# Patient Record
Sex: FEMALE | Race: WHITE | Employment: FULL TIME | URBAN - METROPOLITAN AREA
[De-identification: names, ages, dates, MRNs, and addresses within clinical notes are randomized per-mention and may not be internally consistent; named-entity substitution may affect disease eponyms.]

---

## 2021-09-05 ENCOUNTER — HOSPITAL ENCOUNTER (EMERGENCY)
Age: 31
Discharge: HOME | End: 2021-09-05
Attending: EMERGENCY MEDICINE
Payer: COMMERCIAL

## 2021-09-05 VITALS
RESPIRATION RATE: 15 BRPM | HEIGHT: 67 IN | SYSTOLIC BLOOD PRESSURE: 133 MMHG | HEART RATE: 89 BPM | OXYGEN SATURATION: 99 % | WEIGHT: 218.9 LBS | BODY MASS INDEX: 34.36 KG/M2 | DIASTOLIC BLOOD PRESSURE: 77 MMHG | TEMPERATURE: 98.1 F

## 2021-09-05 DIAGNOSIS — L50.9 URTICARIAL RASH: Primary | ICD-10-CM

## 2021-09-05 DIAGNOSIS — Z3A.37 37 WEEKS GESTATION OF PREGNANCY: ICD-10-CM

## 2021-09-05 PROCEDURE — 96372 THER/PROPH/DIAG INJ SC/IM: CPT

## 2021-09-05 PROCEDURE — 6360000002 HC RX W HCPCS: Performed by: EMERGENCY MEDICINE

## 2021-09-05 PROCEDURE — 99283 EMERGENCY DEPT VISIT LOW MDM: CPT

## 2021-09-05 RX ORDER — PREDNISONE 10 MG/1
TABLET ORAL
Qty: 30 TABLET | Refills: 0 | Status: SHIPPED | OUTPATIENT
Start: 2021-09-05 | End: 2021-09-15

## 2021-09-05 RX ORDER — METHYLPREDNISOLONE SODIUM SUCCINATE 125 MG/2ML
80 INJECTION, POWDER, LYOPHILIZED, FOR SOLUTION INTRAMUSCULAR; INTRAVENOUS ONCE
Status: COMPLETED | OUTPATIENT
Start: 2021-09-05 | End: 2021-09-05

## 2021-09-05 RX ADMIN — METHYLPREDNISOLONE SODIUM SUCCINATE 80 MG: 125 INJECTION, POWDER, FOR SOLUTION INTRAMUSCULAR; INTRAVENOUS at 12:16

## 2021-09-05 NOTE — ED PROVIDER NOTES
TRIAGE CHIEF COMPLAINT:   Chief Complaint   Patient presents with    Urticaria         HPI: Sophy Jain is a 33 y.o. female who presents to the Emergency Department with complaint of urticarial rash that began 4 days ago. It started on the abdomen and spread to her entire body. Denies shortness of breath. No new medications or new exposures. She saw her OB/GYN who prescribed hydrocortisone cream and then Benadryl. Despite that the rash is getting worse and she is itching. She talk to her OB doctor today who recommended she come to the ED to get a shot of a steroid and a prescription for prednisone. Denies any facial or oral swelling. No abdominal pain or bleeding. She is feeling fetal movement. Patient is 37 weeks pregnant and is scheduled to be induced on September 15. REVIEW OF SYSTEMS:  6 systems reviewed. Pertinent positives per HPI. Otherwise noted to be negative. Nursing notes reviewed and agree with above. Past medical/surgical history reviewed. MEDICATIONS   Patient's Medications    No medications on file         ALLERGIES   Allergies   Allergen Reactions    Amoxicillin     Penicillins          /77   Pulse 89   Temp 98.1 °F (36.7 °C) (Oral)   Resp 15   Ht 5' 7\" (1.702 m)   Wt 218 lb 14.4 oz (99.3 kg)   LMP 12/17/2020 (Exact Date)   SpO2 99%   Breastfeeding No   BMI 34.28 kg/m²   General:  No acute distress. Non toxic appearance  Head:   Normocephalic and atraumatic  Eyes:   Conjunctiva clear, ANNA, EOM's intact. Sclera anicteric. ENT:   Mucous membranes moist  Neck:   Supple. No adenopathy. Lungs/Chest:  No respiratory distress  CVS:   Regular rate and rhythm  Extremities:  Full range of motion  Skin:   Scattered confluent urticarial rash mostly on her gravid abdomen and to a lesser extent on the extremities and neck. No vesicles. Neuro:  Alert and OX3. Speech clear and appropriate. No extremity weakness. Normal sensation in all extremities.   No facial asymmetry. Gait normal.  Psych:   Affect normal. Mood normal        RADIOLOGY:      LAB      ED COURSE / MDM:  38-year-old female, 37 weeks pregnant, scheduled to be induced on September 15, no abdominal pain or bleeding, with 4-day history of itchy urticarial rash that started on her abdomen and spread to her body. No shortness of breath or chest pain. No facial or oral swelling. She has not getting any relief from hydrocortisone cream or Benadryl. Her OB/GYN referred her to the ED to get a shot of steroids and a prescription for prednisone. She was given 80 mg of Solu-Medrol IM. She will be given a prescription for prednisone. She was offered a different antihistamine but states she will continue taking Benadryl. Advise return here for worse symptoms such as shortness of breath facial or oral swelling. I discussed with Marissa Ureña the results of evaluation in the Emergency Department, diagnosis, care and prognosis. The plan is to discharge to home. The patient is in agreement with the plan and questions have been answered. I also discussed with the patient and/or family the reasons which may require a return visit and the importance of follow-up care.        (Please note that portions of this note may have been completed with a voice recognition program.  Efforts were made to edit the dictation but occasionally words are mis-transcribed)        FINAL IMPRESSION:  1 --urticarial rash  2 --37 weeks pregnancy                  Negar Arzate MD  09/05/21 [id number]

## 2021-09-05 NOTE — ED TRIAGE NOTES
32y/o female presents to the ED with hives, onset Wednesday. Pt was treated by OB/GYN with hydrocortisone cream and then started Benadryl on Friday d/t worsening of hives. Pt states today she continues to have hives and itching.  Pt referred by OB/Gy to be seen in ED. -sob -c/p

## 2022-12-23 ENCOUNTER — APPOINTMENT (OUTPATIENT)
Dept: ULTRASOUND IMAGING | Age: 32
End: 2022-12-23
Payer: COMMERCIAL

## 2022-12-23 ENCOUNTER — HOSPITAL ENCOUNTER (EMERGENCY)
Age: 32
Discharge: HOME | End: 2022-12-24
Attending: EMERGENCY MEDICINE
Payer: COMMERCIAL

## 2022-12-23 DIAGNOSIS — O46.90 VAGINAL BLEEDING IN PREGNANCY: Primary | ICD-10-CM

## 2022-12-23 LAB
ABO/RH: NORMAL
ANION GAP SERPL CALCULATED.3IONS-SCNC: 8 MMOL/L (ref 3–16)
ANTIBODY SCREEN: NORMAL
BACTERIA WET PREP: NORMAL
BACTERIA: ABNORMAL /HPF
BILIRUBIN URINE: NEGATIVE
BLOOD, URINE: ABNORMAL
BUN BLDV-MCNC: 12 MG/DL (ref 7–20)
CALCIUM SERPL-MCNC: 9.6 MG/DL (ref 8.3–10.6)
CHLORIDE BLD-SCNC: 105 MMOL/L (ref 99–110)
CLARITY: CLEAR
CLUE CELLS: NORMAL
CO2: 27 MMOL/L (ref 21–32)
COLOR: YELLOW
CREAT SERPL-MCNC: 0.9 MG/DL (ref 0.6–1.1)
EPITHELIAL CELLS WET PREP: NORMAL
EPITHELIAL CELLS, UA: ABNORMAL /HPF (ref 0–5)
GFR SERPL CREATININE-BSD FRML MDRD: >60 ML/MIN/{1.73_M2}
GLUCOSE BLD-MCNC: 102 MG/DL (ref 70–99)
GLUCOSE URINE: NEGATIVE MG/DL
GONADOTROPIN, CHORIONIC (HCG) QUANT: 2342 MIU/ML
HCT VFR BLD CALC: 37.6 % (ref 36–48)
HCT VFR BLD CALC: 39.5 % (ref 36–48)
HEMOGLOBIN: 12.8 G/DL (ref 12–16)
HEMOGLOBIN: 13.3 G/DL (ref 12–16)
KETONES, URINE: NEGATIVE MG/DL
LEUKOCYTE ESTERASE, URINE: ABNORMAL
MCH RBC QN AUTO: 28.2 PG (ref 26–34)
MCHC RBC AUTO-ENTMCNC: 33.7 G/DL (ref 31–36)
MCV RBC AUTO: 83.6 FL (ref 80–100)
MICROSCOPIC EXAMINATION: YES
NITRITE, URINE: NEGATIVE
PDW BLD-RTO: 13.9 % (ref 12.4–15.4)
PH UA: 5.5 (ref 5–8)
PLATELET # BLD: 278 K/UL (ref 135–450)
PMV BLD AUTO: 8.2 FL (ref 5–10.5)
POTASSIUM REFLEX MAGNESIUM: 3.8 MMOL/L (ref 3.5–5.1)
PROTEIN UA: NEGATIVE MG/DL
RBC # BLD: 4.72 M/UL (ref 4–5.2)
RBC UA: ABNORMAL /HPF (ref 0–4)
RBC WET PREP: NORMAL
SODIUM BLD-SCNC: 140 MMOL/L (ref 136–145)
SOURCE WET PREP: NORMAL
SPECIFIC GRAVITY UA: <=1.005 (ref 1–1.03)
TRICHOMONAS PREP: NORMAL
URINE REFLEX TO CULTURE: ABNORMAL
URINE TYPE: ABNORMAL
UROBILINOGEN, URINE: 0.2 E.U./DL
WBC # BLD: 5 K/UL (ref 4–11)
WBC UA: ABNORMAL /HPF (ref 0–5)
WBC WET PREP: NORMAL
YEAST WET PREP: NORMAL

## 2022-12-23 PROCEDURE — 85014 HEMATOCRIT: CPT

## 2022-12-23 PROCEDURE — 86901 BLOOD TYPING SEROLOGIC RH(D): CPT

## 2022-12-23 PROCEDURE — 76801 OB US < 14 WKS SINGLE FETUS: CPT

## 2022-12-23 PROCEDURE — 84702 CHORIONIC GONADOTROPIN TEST: CPT

## 2022-12-23 PROCEDURE — 99284 EMERGENCY DEPT VISIT MOD MDM: CPT

## 2022-12-23 PROCEDURE — 81001 URINALYSIS AUTO W/SCOPE: CPT

## 2022-12-23 PROCEDURE — 96372 THER/PROPH/DIAG INJ SC/IM: CPT

## 2022-12-23 PROCEDURE — 6370000000 HC RX 637 (ALT 250 FOR IP): Performed by: EMERGENCY MEDICINE

## 2022-12-23 PROCEDURE — 86850 RBC ANTIBODY SCREEN: CPT

## 2022-12-23 PROCEDURE — 87210 SMEAR WET MOUNT SALINE/INK: CPT

## 2022-12-23 PROCEDURE — 87491 CHLMYD TRACH DNA AMP PROBE: CPT

## 2022-12-23 PROCEDURE — 86900 BLOOD TYPING SEROLOGIC ABO: CPT

## 2022-12-23 PROCEDURE — 80048 BASIC METABOLIC PNL TOTAL CA: CPT

## 2022-12-23 PROCEDURE — 85018 HEMOGLOBIN: CPT

## 2022-12-23 PROCEDURE — 85027 COMPLETE CBC AUTOMATED: CPT

## 2022-12-23 PROCEDURE — 87591 N.GONORRHOEAE DNA AMP PROB: CPT

## 2022-12-23 RX ORDER — CEFUROXIME AXETIL 250 MG/1
500 TABLET ORAL EVERY 12 HOURS SCHEDULED
Status: DISCONTINUED | OUTPATIENT
Start: 2022-12-23 | End: 2022-12-24 | Stop reason: HOSPADM

## 2022-12-23 RX ORDER — CEFUROXIME AXETIL 250 MG/1
250 TABLET ORAL 2 TIMES DAILY
Qty: 14 TABLET | Refills: 0 | Status: SHIPPED | OUTPATIENT
Start: 2022-12-23 | End: 2022-12-30

## 2022-12-23 RX ORDER — ACETAMINOPHEN 500 MG
1000 TABLET ORAL ONCE
Status: DISCONTINUED | OUTPATIENT
Start: 2022-12-23 | End: 2022-12-23

## 2022-12-23 RX ADMIN — CEFUROXIME AXETIL 500 MG: 250 TABLET, FILM COATED ORAL at 20:29

## 2022-12-23 ASSESSMENT — ENCOUNTER SYMPTOMS
SORE THROAT: 0
SHORTNESS OF BREATH: 0
CHEST TIGHTNESS: 0
ABDOMINAL PAIN: 0
NAUSEA: 0
VOMITING: 0

## 2022-12-23 ASSESSMENT — PAIN - FUNCTIONAL ASSESSMENT
PAIN_FUNCTIONAL_ASSESSMENT: NONE - DENIES PAIN
PAIN_FUNCTIONAL_ASSESSMENT: NONE - DENIES PAIN

## 2022-12-24 VITALS
WEIGHT: 179.9 LBS | BODY MASS INDEX: 28.24 KG/M2 | HEIGHT: 67 IN | OXYGEN SATURATION: 98 % | SYSTOLIC BLOOD PRESSURE: 126 MMHG | HEART RATE: 99 BPM | RESPIRATION RATE: 18 BRPM | TEMPERATURE: 97.7 F | DIASTOLIC BLOOD PRESSURE: 79 MMHG

## 2022-12-24 LAB — RHIG LOT NUMBER: NORMAL

## 2022-12-24 PROCEDURE — 6360000002 HC RX W HCPCS: Performed by: STUDENT IN AN ORGANIZED HEALTH CARE EDUCATION/TRAINING PROGRAM

## 2022-12-24 RX ADMIN — HUMAN RHO(D) IMMUNE GLOBULIN 300 MCG: 300 INJECTION, SOLUTION INTRAMUSCULAR at 01:57

## 2022-12-24 ASSESSMENT — PAIN SCALES - GENERAL: PAINLEVEL_OUTOF10: 0

## 2022-12-24 ASSESSMENT — PAIN - FUNCTIONAL ASSESSMENT: PAIN_FUNCTIONAL_ASSESSMENT: 0-10

## 2022-12-24 NOTE — DISCHARGE INSTRUCTIONS
Please have your pregnancy levels repeated in 48 hours. Return to the hospital if you have any new abdominal pain, nausea, vomiting, episodes of passing out or if you have any additional concerns. You can take Tylenol if you have some mild pain.

## 2022-12-24 NOTE — ED NOTES
Pt. In no acute distress. Breathing easy and educated on follow up care/medications. Ambulated out of ED with partner.       Chang Lin RN  12/24/22 [id number]

## 2022-12-24 NOTE — ED NOTES
Pt was prepared to be transported by POV to [facility] ED for ultrasound not available at referring. IV removed prior to discharge. Pt does not appear to be in any distress at this time. Report called to Prashanth Early RN at [facility].      Dax Cisneros RN  12/23/22 2051

## 2022-12-24 NOTE — ED PROVIDER NOTES
[address] [facility]  EMERGENCY DEPARTMENTENCOUNTER      Pt Name: Jane Leavitt  MRN: [id number]  [place] 1990  Date ofevaluation: 2022  Provider: Meka Hennessy MD    CHIEF COMPLAINT       Chief Complaint   Patient presents with    Vaginal Bleeding     Pt states that she is 6 weeks pregnant and started spotting last evening and then again today and called on call nurse and referred to er for evaluation         HISTORY OF PRESENT ILLNESS   (Location/Symptom, Timing/Onset,Context/Setting, Quality, Duration, Modifying Factors, Severity)  Note limiting factors. Jane Leavitt is a 27 y.o. female  who  has no past medical history on file. who presents to the emergency department for evaluation of vaginal bleeding in the first trimester. Patient reports that she had a positive pregnancy test approximately 1 week ago. She states she is currently 6 weeks pregnant by dates. Reports her last menstrual period was  and was abnormal timing and duration. Reports having a scant amount of blood-tinged discharge previous day and is now having gross hemorrhage from her vagina. Denies injury or trauma. Abdominal pains fevers nausea or vomiting. Patient is a  and states she no complications during her previous pregnancy. She is establish care with her OB Dr. Bill Bruno but has not yet seen them for prenatal visit. She did call on-call nurse and was instructed to present to the ED. patient reports that she is Rh- and did receive RhoGAM during her previous pregnancy. She states that she is pretty sure the father of the child is Rh+.     HPI    NursingNotes were reviewed. REVIEW OF SYSTEMS    (2-9 systems for level 4, 10 or more for level 5)     Review of Systems   Constitutional:  Negative for fever. Gastrointestinal:  Negative for abdominal pain, nausea and vomiting. Genitourinary:  Positive for menstrual problem and vaginal bleeding.  Negative for dysuria, pelvic pain, urgency, vaginal discharge and vaginal pain. Except as noted above the remainder of the review of systems was reviewed and negative. PAST MEDICAL HISTORY   History reviewed. No pertinent past medical history. SURGICALHISTORY     History reviewed. No pertinent surgical history. CURRENT MEDICATIONS       Previous Medications    No medications on file            Amoxicillin and Penicillins    FAMILY HISTORY     History reviewed. No pertinent family history. SOCIAL HISTORY       Social History     Socioeconomic History    Marital status:      Spouse name: None    Number of children: None    Years of education: None    Highest education level: None   Tobacco Use    Smoking status: Never    Smokeless tobacco: Never   Substance and Sexual Activity    Alcohol use: Never    Drug use: Never       SCREENINGS    [place] Coma Scale  Eye Opening: Spontaneous  Best Verbal Response: Oriented  Best Motor Response: Obeys commands  David Coma Scale Score: 15        PHYSICAL EXAM    (up to 7 for level 4, 8 or more for level 5)     ED Triage Vitals [12/23/22 1839]   BP Temp Temp Source Heart Rate Resp SpO2 Height Weight   (!) 137/99 98.4 °F (36.9 °C) Oral 83 14 100 % 5' 7\" (1.702 m) 179 lb 14.4 oz (81.6 kg)       Physical Exam  Vitals reviewed. Constitutional:       Appearance: She is well-developed. HENT:      Head: Normocephalic and atraumatic. Mouth/Throat:      Mouth: Mucous membranes are moist.   Eyes:      Extraocular Movements: Extraocular movements intact. Conjunctiva/sclera: Conjunctivae normal.      Pupils: Pupils are equal, round, and reactive to light. Neck:      Trachea: No tracheal deviation. Cardiovascular:      Rate and Rhythm: Normal rate and regular rhythm. Heart sounds: Normal heart sounds. Pulmonary:      Effort: Pulmonary effort is normal.      Breath sounds: Normal breath sounds. Abdominal:      General: There is no distension. Palpations: Abdomen is soft. Tenderness: There is no abdominal tenderness. There is no guarding or rebound. Genitourinary:     Comments: Blood products in vaginal vault. Patient has a closed os with some blood products/proximal of conception at the os opening. No lacerations friability or tenderness on exam.   Musculoskeletal:         General: Normal range of motion. Cervical back: Normal range of motion. Skin:     General: Skin is warm and dry. Capillary Refill: Capillary refill takes less than 2 seconds. Neurological:      Mental Status: She is alert. RESULTS     EKG: All EKG's are interpreted by the Emergency Department Physician who either signs or Co-signsthis chart in the absence of a cardiologist.      RADIOLOGY:   Remy Nicole such as CT, Ultrasound and MRI are read by the radiologist. Plain radiographic images are visualized and preliminarily interpreted by the emergency physician with the below findings:    Interpretation per the Radiologist below, if available at the time ofthis note:    No orders to display         ED BEDSIDE ULTRASOUND:   Performed by ED Physician - none    LABS:  Labs Reviewed   URINALYSIS WITH REFLEX TO CULTURE - Abnormal; Notable for the following components:       Result Value    Blood, Urine LARGE (*)     Leukocyte Esterase, Urine SMALL (*)     All other components within normal limits   MICROSCOPIC URINALYSIS - Abnormal; Notable for the following components:    WBC, UA 6-9 (*)     Bacteria, UA 1+ (*)     All other components within normal limits   WET PREP, GENITAL   C.TRACHOMATIS N.GONORRHOEAE DNA   CBC   BASIC METABOLIC PANEL W/ REFLEX TO MG FOR LOW K   HCG, QUANTITATIVE, PREGNANCY   PREGNANCY, URINE       All other labs were within normal range or not returned as of this dictation.     EMERGENCY DEPARTMENT COURSE and DIFFERENTIAL DIAGNOSIS/MDM:   Vitals:    Vitals:    12/23/22 1839   BP: (!) 137/99   Pulse: 83   Resp: 14   Temp: 98.4 °F (36.9 °C)   TempSrc: Oral   SpO2: 100% Weight: 179 lb 14.4 oz (81.6 kg)   Height: 5' 7\" (1.702 m)       Patient was given thefollowing medications:  Medications - No data to display    ED COURSE & MEDICAL DECISION MAKING    Pertinent Labs & Imaging studies reviewed. (See chart for details)   -  Patient seen and evaluated in the emergency department. -  Triage and nursing notes reviewed and incorporated. -  Old chart records reviewed and incorporated. -  Differential diagnosis includes: Differential diagnosis: Ectopic pregnancy, Molar pregnancy, Miscarriage, Hemorrhagic Shock, Rh incompatibility, UTI, other.    -  Work-up included:  See above  -  ED treatment included: See above  -  Results discussed with patient. 75-year-old female G2, P1 states she is approximate 6 weeks pregnant by dates presents the ED for evaluation of vaginal bleeding. On exam vital signs within normal limits and abdominal exam is benign. labs show an appropriately elevated beta hCG. Hemoglobin within normal limits. Urinalysis does have leukocyte esterase and pyuria and patient was started on a course of oral antibiotics. Because the patient is Rh- and likely will need a ultrasound to rule out ectopic pregnancy she was sent to [facility]. where there is RhoGAM available at ultrasound. Medical necessity discussed with patient she is amenable to treatment plan. patient feels improved on reevaluation. Symptomatic treatment with expectant management discussed with the patient and they and/or family members present are amenable to treatment plan and outpatient follow-up. Strict return precautions were discussed with the patient and those present. They demonstrated understanding of when to return to the emergency department for new or worsening symptoms. .  The patient is agreeable with plan of care and disposition. Is this patient to be included in the SEP-1 Core Measure due to severe sepsis or septic shock?    No   Exclusion criteria - the patient is NOT to be included for SEP-1 Core Measure due to:  2+ SIRS criteria are not met      REASSESSMENT     ED Course as of 12/23/22 2324   Fri Dec 23, 2022   2234 Transabdominal ultrasound at this time there is no evidence of an intrauterine pregnancy appreciated. The patient was noted to be tachycardic upon arrival here and she states that she is feeling anxious but she is also has had slightly increased more bleeding and thus an H&H was repeated. [EI]      ED Course User Index  [EI] Pascual Alonzo MD         CRITICAL CARE TIME   Total Critical Care time was 0 minutes, excluding separately reportable procedures. There was a high probability of clinically significant/life threatening deterioration in the patient's condition which required my urgent intervention. CONSULTS:  None    PROCEDURES:  Unless otherwise noted below, none     Procedures    FINAL IMPRESSION      1. Threatened , antepartum          DISPOSITION/PLAN   DISPOSITION        PATIENT REFERREDTO:  No follow-up provider specified.     DISCHARGEMEDICATIONS:  New Prescriptions    No medications on file          (Please note that portions of this note were completed with a voice recognition program.  Efforts were made to edit the dictations but occasionally words are mis-transcribed.)    Ricardo Patel MD (electronically signed)  Attending Emergency Physician          Ricardo Patel MD  [phone]       Ricardo Patel MD  22 [phone]

## 2022-12-24 NOTE — ED PROVIDER NOTES
[address] ENCOUNTER          ATTENDING PHYSICIAN NOTE       Date of evaluation: 12/23/2022    Chief Complaint     Vaginal Bleeding (Pt states that she is 6 weeks pregnant and started spotting last evening and then again today and called on call nurse and referred to er for evaluation)      History of Present Illness     Imani Wheeler is a 27 y.o. female G2, P1 who presents to the hospital today for evaluation of vaginal bleeding. The patient was seen in outside hospital earlier today after she came in with spotting that she has had intermittently since yesterday that since improved last night but has increased in frequency at this time. She denies any pain with this. She had a pregnancy she carried to term the first time and at that time had received RhoGAM as she had had some bleeding with a Rh negative blood type. She denies any lightheadedness syncope but states that her bleeding has since become worse. Review of Systems     Review of Systems   Constitutional:  Negative for chills and fever. HENT:  Negative for congestion and sore throat. Respiratory:  Negative for chest tightness and shortness of breath. Cardiovascular:  Negative for chest pain and leg swelling. Gastrointestinal:  Negative for abdominal pain, nausea and vomiting. Genitourinary:  Positive for vaginal bleeding. Negative for difficulty urinating, flank pain and pelvic pain. Musculoskeletal:  Negative for gait problem and neck pain. Skin:  Negative for pallor and wound. Neurological:  Negative for dizziness, syncope, weakness and light-headedness. Hematological:  Negative for adenopathy. Psychiatric/Behavioral:  Negative for confusion. Past Medical, Surgical, Family, and Social History     She has no past medical history on file. She has no past surgical history on file. Her family history is not on file. She reports that she has never smoked.  She has never used smokeless tobacco. She reports that she does not drink alcohol and does not use drugs. Medications     Previous Medications    No medications on file       Allergies     She is allergic to amoxicillin and penicillins. Physical Exam     INITIAL VITALS: BP: (!) 137/99, Temp: 98.4 °F (36.9 °C), Heart Rate: 83, Resp: 14, SpO2: 100 %   Physical Exam  Vitals and nursing note reviewed. Constitutional:       Appearance: Normal appearance. She is well-developed. She is not ill-appearing or diaphoretic. HENT:      Head: Normocephalic and atraumatic. Cardiovascular:      Rate and Rhythm: Normal rate and regular rhythm. Pulmonary:      Effort: Pulmonary effort is normal.      Breath sounds: Normal breath sounds. Abdominal:      Palpations: Abdomen is soft. Musculoskeletal:         General: No tenderness or deformity. Normal range of motion. Cervical back: Neck supple. Skin:     General: Skin is warm and dry. Findings: No erythema. Neurological:      General: No focal deficit present. Mental Status: She is alert and oriented to person, place, and time. Cranial Nerves: No cranial nerve deficit. Coordination: Coordination normal.       Diagnostic Results       RADIOLOGY:  US OB LESS THAN 14 WEEKS SINGLE OR FIRST GESTATION   Final Result      1. Pregnancy of unknown location. Thickened endometrial bilayer containing a 16 mm hyperechoic ovoid lesion and no definite intrauterine gestational sac. There is also a 2 cm hyperechoic left ovarian lesion which is suspicious for a dermoid cyst or    other incidental finding. Given last menstrual period 5 weeks 6 days ago, recommend serial beta HCG measurements and follow-up ultrasound.           LABS:   Results for orders placed or performed during the hospital encounter of 12/23/22   Wet prep, genital    Specimen: Vaginal   Result Value Ref Range    Trichomonas Prep None Seen     Yeast, Wet Prep None Seen     Clue Cells, Wet Prep None Seen     WBC, Wet Prep <1+ RBC, Wet Prep 4+     Epi Cells 2+     Bacteria 1+     Source Wet Prep Vaginal    BMP w/ Reflex to MG   Result Value Ref Range    Sodium 140 136 - 145 mmol/L    Potassium reflex Magnesium 3.8 3.5 - 5.1 mmol/L    Chloride 105 99 - 110 mmol/L    CO2 27 21 - 32 mmol/L    Anion Gap 8 3 - 16    Glucose 102 (H) 70 - 99 mg/dL    BUN 12 7 - 20 mg/dL    Creatinine 0.9 0.6 - 1.1 mg/dL    Est, Glom Filt Rate >60 >60    Calcium 9.6 8.3 - 10.6 mg/dL   CBC   Result Value Ref Range    WBC 5.0 4.0 - 11.0 K/uL    RBC 4.72 4.00 - 5.20 M/uL    Hemoglobin 13.3 12.0 - 16.0 g/dL    Hematocrit 39.5 36.0 - 48.0 %    MCV 83.6 80.0 - 100.0 fL    MCH 28.2 26.0 - 34.0 pg    MCHC 33.7 31.0 - 36.0 g/dL    RDW 13.9 12.4 - 15.4 %    Platelets 103 558 - 115 K/uL    MPV 8.2 5.0 - 10.5 fL   HCG, Quantitative, Pregnancy   Result Value Ref Range    hCG Quant 2342.0 <5.0 mIU/mL   Urinalysis with Reflex to Culture    Specimen: Urine   Result Value Ref Range    Color, UA Yellow Straw/Yellow    Clarity, UA Clear Clear    Glucose, Ur Negative Negative mg/dL    Bilirubin Urine Negative Negative    Ketones, Urine Negative Negative mg/dL    Specific Gravity, UA <=1.005 1.005 - 1.030    Blood, Urine LARGE (A) Negative    pH, UA 5.5 5.0 - 8.0    Protein, UA Negative Negative mg/dL    Urobilinogen, Urine 0.2 <2.0 E.U./dL    Nitrite, Urine Negative Negative    Leukocyte Esterase, Urine SMALL (A) Negative    Microscopic Examination YES     Urine Type NotGiven     Urine Reflex to Culture Not Indicated    Microscopic Urinalysis   Result Value Ref Range    WBC, UA 6-9 (A) 0 - 5 /HPF    RBC, UA 3-4 0 - 4 /HPF    Epithelial Cells, UA 2-5 0 - 5 /HPF    Bacteria, UA 1+ (A) None Seen /HPF   Hemoglobin and Hematocrit   Result Value Ref Range    Hemoglobin 12.8 12.0 - 16.0 g/dL    Hematocrit 37.6 36.0 - 48.0 %   TYPE AND SCREEN   Result Value Ref Range    ABO/Rh B NEG     Antibody Screen NEG    RHOGAM INJECTION ONLY   Result Value Ref Range    RHIG LOT NUMBER RhImmuneGlobulin    [id number]          issued       1 vial  22 01:26       ED BEDSIDE ULTRASOUND:  No results found. RECENT VITALS:  BP: 126/79,Temp: 97.7 °F (36.5 °C), Heart Rate: (!) 105, Resp: 14, SpO2: 98 %     Procedures         ED Course     Nursing Notes, Past Medical Hx, Past Surgical Hx, Social Hx,Allergies, and Family Hx were reviewed. ED Course as of 12/24/22 0203   Fri Dec 23, 2022   2234 Transabdominal ultrasound at this time there is no evidence of an intrauterine pregnancy appreciated. The patient was noted to be tachycardic upon arrival here and she states that she is feeling anxious but she is also has had slightly increased more bleeding and thus an H&H was repeated. [EI]      ED Course User Index  [EI] Anurag Campo MD     Patient had a pelvic exam done at outside hospital which was found to have a closed cervical os with some blood in the vaginal vault. She was on some IV antibiotics due to concern for urinary tract infection as well prior to arrival.  The patient's heart rate improved here in the department after all her work-up was done. She does not appear to be significantly more anemic than she was earlier today. patient was given the following medications:  Orders Placed This Encounter   Medications    cefUROXime (CEFTIN) tablet 500 mg     Order Specific Question:   Antimicrobial Indications     Answer:   Urinary Tract Infection    cefUROXime (CEFTIN) 250 MG tablet     Sig: Take 1 tablet by mouth 2 times daily for 7 days     Dispense:  14 tablet     Refill:  0    rho(D) immune globulin (HYPERRHO S/D) injection 300 mcg    DISCONTD: acetaminophen (TYLENOL) tablet 1,000 mg    rho(D) immune globulin (HYPERRHO S/D) injection 300 mcg       CONSULTS:  None    MEDICAL DECISIONMAKING / ASSESSMENT / Jennyarmando Johnston is a 27 y.o. female  who presented to the hospital for evaluation of vaginal bleeding.   Patient noted to have closed cervical os and her transvaginal ultrasound at this time shows no definitive intrauterine pregnancy. She is currently hemodynamically stable was encouraged to come back to the hospital to have her hCG levels repeated in 48 hours. She was given a dose of RhoGAM here at this time. She was encouraged to continue taking antibiotics that were prescribed. Clinical Impression     1. Vaginal bleeding in pregnancy        Disposition     PATIENT REFERRED TO:  Anton Barajas.   [facility] [employer] [id number]  [facility]  [phone]    Schedule an appointment as soon as possible for a visit   As needed    [facility]  SARA Stubbs [address] [place] [address]  [phone]  In 2 days  to have repeat hCG levels obtained      DISCHARGE MEDICATIONS:  New Prescriptions    CEFUROXIME (CEFTIN) 250 MG TABLET    Take 1 tablet by mouth 2 times daily for 7 days       DISPOSITION Decision To Discharge 12/24/2022 01:29:24 AM         Aguila Berry MD  12/24/22 7484

## 2022-12-25 ENCOUNTER — HOSPITAL ENCOUNTER (EMERGENCY)
Age: 32
Discharge: HOME | End: 2022-12-25
Attending: STUDENT IN AN ORGANIZED HEALTH CARE EDUCATION/TRAINING PROGRAM
Payer: COMMERCIAL

## 2022-12-25 VITALS
HEART RATE: 92 BPM | RESPIRATION RATE: 16 BRPM | OXYGEN SATURATION: 94 % | TEMPERATURE: 98.1 F | DIASTOLIC BLOOD PRESSURE: 91 MMHG | SYSTOLIC BLOOD PRESSURE: 136 MMHG

## 2022-12-25 DIAGNOSIS — O36.80X0 PREGNANCY, LOCATION UNKNOWN: Primary | ICD-10-CM

## 2022-12-25 LAB — GONADOTROPIN, CHORIONIC (HCG) QUANT: 2926 MIU/ML

## 2022-12-25 PROCEDURE — 36415 COLL VENOUS BLD VENIPUNCTURE: CPT

## 2022-12-25 PROCEDURE — 99283 EMERGENCY DEPT VISIT LOW MDM: CPT

## 2022-12-25 PROCEDURE — 84702 CHORIONIC GONADOTROPIN TEST: CPT

## 2022-12-25 NOTE — ED PROVIDER NOTES
[id number] RESIDENT NOTE       Date of evaluation: 2022    Chief Complaint     Labs Only (Pt. Presents to ED needing repeat HCG level drawn after being seen here in ED on  for a threatened miscarriage. )      of Present Illness     Tiffany Bryan is a 27 y.o. female  who presents for repeat hCG quant. Patient was evaluated in the emergency department on  after vaginal bleeding with last menstrual period of . Transvaginal ultrasound did not show a definitive intrauterine pregnancy and her hCG was 2342. Since discharge, she reports that her vaginal bleeding has largely resolved and she is only had a few drops of blood noticed when wiping. She denies any abdominal pain and has had only mild nausea without actual emesis. Review of Systems     A >12 review of systems was performed and is otherwise negative except as noted in HPI    Past Medical, Surgical, Family, and Social History     She has no past medical history on file. She has no past surgical history on file. Her family history is not on file. She reports that she has never smoked. She has never used smokeless tobacco. She reports that she does not drink alcohol and does not use drugs. Medications     Discharge Medication List as of 2022  8:07 PM        CONTINUE these medications which have NOT CHANGED    Details   cefUROXime (CEFTIN) 250 MG tablet Take 1 tablet by mouth 2 times daily for 7 days, Disp-14 tablet, R-0Normal             Allergies     She is allergic to amoxicillin and penicillins. Physical Exam     INITIAL VITALS: BP: (!) 136/91, Temp: 98.1 °F (36.7 °C), Heart Rate: 92, Resp: 16, SpO2: 94 %   Physical Exam  Constitutional:       Appearance: Normal appearance. HENT:      Head: Normocephalic and atraumatic. Nose: Nose normal.   Eyes:      Extraocular Movements: Extraocular movements intact.    Cardiovascular:      Rate and Rhythm: Normal rate.   Pulmonary:      Effort: Pulmonary effort is normal.   Abdominal:      General: Abdomen is flat. Tenderness: There is no abdominal tenderness. Musculoskeletal:         General: Normal range of motion. Cervical back: Normal range of motion. Skin:     General: Skin is warm. Capillary Refill: Capillary refill takes less than 2 seconds. Neurological:      Mental Status: She is alert and oriented to person, place, and time. Mental status is at baseline. Psychiatric:         Behavior: Behavior normal.         Thought Content: Thought content normal.       DiagnosticResults     RADIOLOGY:  No orders to display       LABS:   Results for orders placed or performed during the hospital encounter of 12/25/22   HCG Serum, Quantitative   Result Value Ref Range    hCG Quant 2926.0 <5.0 mIU/mL       ED BEDSIDE ULTRASOUND:  No results found. RECENT VITALS:  BP: (!) 136/91, Temp: 98.1 °F (36.7 °C), Heart Rate: 92,Resp: 16, SpO2: 94 %     Procedures           MEDICAL DECISION MAKING / ED Course / ASSESSMENT / PLAN   Nursing Notes, Past Medical Hx, Past Surgical Hx, Social Hx, Allergies, and Family Hx were reviewed. This patient was also evaluated by the attending physician. All care plans were discussed and agreed upon. Upon presentation the patient was well-appearing, afebrile, hemodynamically stable. Her abdomen is completely nontender and vaginal bleeding has had a near complete resolution. Her repeat hCG quant was 2926. At this point we will consult her OB/GYN to discuss additional plans for treatment and evaluation. We will also discuss options with the patient. At this time I will be going off service and signing out care to oncoming provider Dr. Helen Mendez. He will be responsible for making contact with the patient's OB/GYN and facilitating appropriate additional evaluation and outpatient follow-up.     The patient was given the followingmedications:  No orders of the defined types were placed in this encounter. CONSULTS:  IP CONSULT TO OB GYN    Clinical Impression     1. Pregnancy, location unknown        Disposition     PATIENT REFERRED TO:  No follow-up provider specified.     DISCHARGE MEDICATIONS:  Discharge Medication List as of 12/25/2022  8:07 PM          DISPOSITION Decision To Discharge 12/25/2022 07:59:05 PM       Talya Segovia MD  12/25/22 1183

## 2022-12-25 NOTE — ED PROVIDER NOTES
ED Attending Attestation Note     Date of evaluation: 12/25/2022    This patient was seen by the resident. I have seen and examined the patient, agree with the workup, evaluation, management and diagnosis. The care plan has been discussed. Briefly, this is a patient who had a positive home pregnancy test several days ago who presents for repeat beta hCG level after having an ED visit for vaginal bleeding and pregnancy of unknown location 2 days ago. Patient reports to me that her symptoms have largely resolved and she currently is feeling very well. She specifically denies any vaginal bleeding, abdominal pain. Her abdominal exam is soft and nontender. Pelvic exam not performed at this time.      Harmeet Zarate MD  12/25/22 2002

## 2022-12-26 LAB
C TRACH DNA GENITAL QL NAA+PROBE: NEGATIVE
N. GONORRHOEAE DNA: NEGATIVE

## 2022-12-26 NOTE — DISCHARGE INSTRUCTIONS
Please call Dr. Trevin Prado early Tuesday morning when his office opens. Plan to see him that morning. Please return to the emergency department if you develop any worse abdominal pain, vaginal bleeding, or other concerns.

## 2022-12-26 NOTE — ED PROVIDER NOTES
[id number] RESIDENT NOTE     Date of evaluation: 2022    ADDENDUM:      Care of this patient was assumed from Dr. Anali Christianson. The patient was seen for Labs Only (Pt. Presents to ED needing repeat HCG level drawn after being seen here in ED on  for a threatened miscarriage. )  . The patient's initial evaluation and plan have been discussed with the prior provider who initially evaluated the patient. Nursing Notes, Past Medical Hx, Past Surgical Hx, Social Hx, Allergies, and Family Hx were all reviewed. Diagnostic Results       RADIOLOGY:  No orders to display       LABS:   Results for orders placed or performed during the hospital encounter of 22   HCG Serum, Quantitative   Result Value Ref Range    hCG Quant 2926.0 <5.0 mIU/mL       RECENT VITALS:  BP: (!) 136/91, Temp: 98.1 °F (36.7 °C), Heart Rate: 92, Resp: 16, SpO2: 94 %     Procedures     none    ED Course          The patient was given the following medications:  No orders of the defined types were placed in this encounter. CONSULTS:  Derrick Carrillo / YOLIS / Sandy Figueroa is a 27 y.o. female  with pregnancy of unknown location. Seen recently with quant [id number] now 48h later only 2926. Benign abdomen, spotting resolved. Patient resting comfortably. Discussed with the patient's OB/GYN, Dr. Cris Barron regarding optimal testing plan and disposition. Discussed options of repeat transvaginal ultrasound here this evening versus rapid follow-up. Dr. Cris Barron indicated that if patient is not having any further bleeding/spotting, no abdominal pain, is hemodynamically stable, she would be appropriate for outpatient follow-up on Tuesday morning. I find this reasonable. Although patient does still have a pregnancy of unknown location, she is hemodynamically stable.   She understands the possible risks and will return to the emergency department if she develops any severe abdominal pain or vaginal bleeding. She will call and go to Dr. Harjinder Basilio office early Tuesday morning. This patient was also evaluated by the attending physician. All care plans were discussed and agreed upon. Clinical Impression     1. Pregnancy, location unknown        Disposition     PATIENT REFERRED TO:  No follow-up provider specified.     DISCHARGE MEDICATIONS:  New Prescriptions    No medications on file       DISPOSITION Decision To Discharge 12/25/2022 07:59:05 PM        Philip Jc MD  Resident  12/25/22 2004